# Patient Record
Sex: MALE
[De-identification: names, ages, dates, MRNs, and addresses within clinical notes are randomized per-mention and may not be internally consistent; named-entity substitution may affect disease eponyms.]

---

## 2021-10-04 ENCOUNTER — HOSPITAL ENCOUNTER (EMERGENCY)
Dept: HOSPITAL 97 - ER | Age: 41
Discharge: HOME | End: 2021-10-04
Payer: SELF-PAY

## 2021-10-04 VITALS — DIASTOLIC BLOOD PRESSURE: 98 MMHG | OXYGEN SATURATION: 97 % | SYSTOLIC BLOOD PRESSURE: 148 MMHG

## 2021-10-04 VITALS — TEMPERATURE: 98.4 F

## 2021-10-04 DIAGNOSIS — M62.82: Primary | ICD-10-CM

## 2021-10-04 LAB
ALBUMIN SERPL BCP-MCNC: 3.6 G/DL (ref 3.4–5)
ALP SERPL-CCNC: 29 U/L (ref 45–117)
ALT SERPL W P-5'-P-CCNC: 54 U/L (ref 12–78)
AST SERPL W P-5'-P-CCNC: 35 U/L (ref 15–37)
BUN BLD-MCNC: 11 MG/DL (ref 7–18)
CKMB CREATINE KINASE MB: 7.4 NG/ML (ref 1–3.6)
GLUCOSE SERPLBLD-MCNC: 108 MG/DL (ref 74–106)
HCT VFR BLD CALC: 42.5 % (ref 39.6–49)
INR BLD: 1.06
LIPASE SERPL-CCNC: 211 U/L (ref 73–393)
LYMPHOCYTES # SPEC AUTO: 2.9 K/UL (ref 0.7–4.9)
MAGNESIUM SERPL-MCNC: 2.2 MG/DL (ref 1.8–2.4)
METHAMPHET UR QL SCN: NEGATIVE
NT-PROBNP SERPL-MCNC: 39 PG/ML (ref ?–125)
PMV BLD: 7.1 FL (ref 7.6–11.3)
POTASSIUM SERPL-SCNC: 3.8 MMOL/L (ref 3.5–5.1)
RBC # BLD: 4.86 M/UL (ref 4.33–5.43)
THC SERPL-MCNC: NEGATIVE NG/ML
TROPONIN (EMERG DEPT USE ONLY): < 0.02 NG/ML (ref 0–0.04)

## 2021-10-04 PROCEDURE — 80076 HEPATIC FUNCTION PANEL: CPT

## 2021-10-04 PROCEDURE — 85025 COMPLETE CBC W/AUTO DIFF WBC: CPT

## 2021-10-04 PROCEDURE — 82553 CREATINE MB FRACTION: CPT

## 2021-10-04 PROCEDURE — 96360 HYDRATION IV INFUSION INIT: CPT

## 2021-10-04 PROCEDURE — 80307 DRUG TEST PRSMV CHEM ANLYZR: CPT

## 2021-10-04 PROCEDURE — 71045 X-RAY EXAM CHEST 1 VIEW: CPT

## 2021-10-04 PROCEDURE — 84484 ASSAY OF TROPONIN QUANT: CPT

## 2021-10-04 PROCEDURE — 96361 HYDRATE IV INFUSION ADD-ON: CPT

## 2021-10-04 PROCEDURE — 83690 ASSAY OF LIPASE: CPT

## 2021-10-04 PROCEDURE — 93005 ELECTROCARDIOGRAM TRACING: CPT

## 2021-10-04 PROCEDURE — 83735 ASSAY OF MAGNESIUM: CPT

## 2021-10-04 PROCEDURE — 85610 PROTHROMBIN TIME: CPT

## 2021-10-04 PROCEDURE — 82550 ASSAY OF CK (CPK): CPT

## 2021-10-04 PROCEDURE — 36415 COLL VENOUS BLD VENIPUNCTURE: CPT

## 2021-10-04 PROCEDURE — 80048 BASIC METABOLIC PNL TOTAL CA: CPT

## 2021-10-04 PROCEDURE — 83880 ASSAY OF NATRIURETIC PEPTIDE: CPT

## 2021-10-04 PROCEDURE — 81003 URINALYSIS AUTO W/O SCOPE: CPT

## 2021-10-04 PROCEDURE — 99284 EMERGENCY DEPT VISIT MOD MDM: CPT

## 2021-10-04 NOTE — EDPHYS
Physician Documentation                                                                           

 [facility]                                                                 

Name: Reginaldo Person                                                                             

Age: 41 yrs                                                                                       

Sex: Male                                                                                         

: 1980                                                                                   

MRN: [id number]                                                                                   

Arrival Date: 10/04/2021                                                                          

Time: 20:28                                                                                       

Account#: [id number]                                                                            

Bed 30                                                                                            

Private MD:                                                                                       

ED Physician Cedric Mcmullen                                                                      

HPI:                                                                                              

10/04                                                                                             

21:16 This 41 yrs old  Male presents to ER via Ambulatory with complaints of Edema.   [facility]ar 

21:16 le edema, dark urine. Onset: The symptoms/episode began/occurred 2 day(s) ago. Severity baldemar 

      of symptoms: At their worst the symptoms were mild in the emergency department the          

      symptoms have improved mildly. The patient has not experienced similar symptoms in the      

      past.                                                                                       

                                                                                                  

Historical:                                                                                       

- Allergies:                                                                                      

20:40 No Known Allergies;                                                                     wg  

- Home Meds:                                                                                      

20:40 None [Active];                                                                          wg  

- PMHx:                                                                                           

20:40 None;                                                                                   wg  

                                                                                                  

- Immunization history:: Adult Immunizations up to date.                                          

- Social history:: Smoking status: Patient denies any tobacco usage or history of.                

                                                                                                  

                                                                                                  

ROS:                                                                                              

21:17 Constitutional: Negative for fever, chills, and weight loss, Eyes: Negative for injury, baldemar 

      pain, redness, and discharge, ENT: Negative for injury, pain, and discharge, Neck:          

      Negative for injury, pain, and swelling, Cardiovascular: Negative for chest pain,           

      palpitations, and edema, Respiratory: Negative for shortness of breath, cough,              

      wheezing, and pleuritic chest pain, Abdomen/GI: Negative for abdominal pain, nausea,        

      vomiting, diarrhea, and constipation, Back: Negative for injury and pain, : Negative      

      for injury, bleeding, discharge, and swelling, MS/Extremity: Negative for injury and        

      deformity, Skin: Negative for injury, rash, and discoloration, Neuro: Negative for          

      headache, weakness, numbness, tingling, and seizure, Psych: Negative for depression,        

      anxiety, suicide ideation, homicidal ideation, and hallucinations, Endocrine: Negative      

      for neck swelling, polydipsia, polyuria, polyphagia, and marked weight changes,             

      Hematologic/Lymphatic: Negative for swollen nodes, abnormal bleeding, and unusual           

      bruising.                                                                                   

                                                                                                  

Exam:                                                                                             

21:17 Constitutional:  This is a well developed, well nourished patient who is awake, alert,  baldemar 

      and in no acute distress. Head/Face:  Normocephalic, atraumatic. Eyes:  Pupils equal        

      round and reactive to light, extra-ocular motions intact.  Lids and lashes normal.          

      Conjunctiva and sclera are non-icteric and not injected.  Cornea within normal limits.      

      Periorbital areas with no swelling, redness, or edema. ENT:  Nares patent. No nasal         

      discharge, no septal abnormalities noted.  Tympanic membranes are normal and external       

      auditory canals are clear.  Oropharynx with no redness, swelling, or masses, exudates,      

      or evidence of obstruction, uvula midline.  Mucous membranes moist. Neck:  Trachea          

      midline, no thyromegaly or masses palpated, and no cervical lymphadenopathy.  Supple,       

      full range of motion without nuchal rigidity, or vertebral point tenderness.  No            

      Meningismus. Chest/axilla:  Normal chest wall appearance and motion.  Nontender with no     

      deformity.  No lesions are appreciated. Cardiovascular:  Regular rate and rhythm with a     

      normal S1 and S2.  No gallops, murmurs, or rubs.  Normal PMI, no JVD.  No pulse             

      deficits. Respiratory:  Lungs have equal breath sounds bilaterally, clear to                

      auscultation and percussion.  No rales, rhonchi or wheezes noted.  No increased work of     

      breathing, no retractions or nasal flaring. Abdomen/GI:  Soft, non-tender, with normal      

      bowel sounds.  No distension or tympany.  No guarding or rebound.  No evidence of           

      tenderness throughout. Back:  No spinal tenderness.  No costovertebral tenderness.          

      Full range of motion. Skin:  Warm, dry with normal turgor.  Normal color with no            

      rashes, no lesions, and no evidence of cellulitis. MS/ Extremity:  Pulses equal, no         

      cyanosis.  Neurovascular intact.  Full, normal range of motion. Neuro:  Awake and           

      alert, GCS 15, oriented to person, place, time, and situation.  Cranial nerves II-XII       

      grossly intact.  Motor strength 5/5 in all extremities.  Sensory grossly intact.            

      Cerebellar exam normal.  Normal gait. Psych:  Awake, alert, with orientation to person,     

      place and time.  Behavior, mood, and affect are within normal limits.                       

                                                                                                  

Vital Signs:                                                                                      

20:37  / 70; Pulse 74; Resp 18; Temp 98.4; Pulse Ox 100% on R/A; Weight 74.84 kg;       wg  

      Height 5 ft. 5 in. (165.10 cm); Pain 0/10;                                                  

22:33  / 98 RA Supine (auto/reg); Pulse 73; Resp 18 S; Temp 98.4(O); Pulse Ox 97% on    sj1 

      R/A; Pain 2/10;                                                                             

20:37 Body Mass Index 27.46 (74.84 kg, 165.10 cm)                                             wg  

                                                                                                  

MDM:                                                                                              

20:43 Patient medically screened.                                                             [facility] 

21:18 Data reviewed: vital signs, nurses notes, lab test result(s), EKG, radiologic studies,  baldemar 

      plain films. Data interpreted: Cardiac monitor: rate is 74 beats/min, rhythm is             

      regular, Pulse oximetry: on room air is 100 %. Test interpretation: by ED physician or      

      midlevel provider: ECG, plain radiologic studies. Counseling: I had a detailed              

      discussion with the patient and/or guardian regarding: the historical points, exam          

      findings, and any diagnostic results supporting the discharge/admit diagnosis, lab          

      results, radiology results.                                                                 

                                                                                                  

10/04                                                                                             

20:45 Order name: Basic Metabolic Panel                                                       [facility] 

10/04                                                                                             

20:45 Order name: CBC with Diff                                                               [facility] 

10/04                                                                                             

20:45 Order name: LFT's                                                                       [facility] 

10/04                                                                                             

20:45 Order name: Magnesium; Complete Time: 22:02                                             [facility] 

10/04                                                                                             

20:45 Order name: NT PRO-BNP; Complete Time: 22:02                                            [facility] 

10/04                                                                                             

20:45 Order name: PT-INR; Complete Time: 21:56                                                [facility] 

10/04                                                                                             

20:45 Order name: Troponin (emerg Dept Use Only); Complete Time: 22:02                        [facility] 

10/04                                                                                             

20:45 Order name: Lipase; Complete Time: 22:02                                                [facility] 

10/04                                                                                             

20:45 Order name: CK; Complete Time: 22:02                                                    [facility] 

10/04                                                                                             

20:45 Order name: Ckmb; Complete Time: 22:02                                                  [facility] 

10/04                                                                                             

20:45 Order name: UDS; Complete Time: 21:56                                                   [facility] 

10/04                                                                                             

20:46 Order name: Basic Metabolic Panel; Complete Time: 22:02                                 [facility]

10/04                                                                                             

20:46 Order name: CBC with Automated Diff; Complete Time: 21:56                               [facility]

10/04                                                                                             

20:46 Order name: Liver (Hepatic) Function; Complete Time: 22:02                              [facility]

10/04                                                                                             

20:42 Order name: Urine Dipstick-Ancillary (obtain specimen); Complete Time: 21:27              

10/04                                                                                             

20:45 Order name: XRAY Chest (1 view); Complete Time: 21:56                                   [facility] 

10/04                                                                                             

20:45 Order name: EKG; Complete Time: 20:46                                                   [facility] 

10/04                                                                                             

20:45 Order name: Cardiac monitoring; Complete Time: 20:59                                    [facility] 

10/04                                                                                             

20:45 Order name: EKG - Nurse/Tech; Complete Time: 21:45                                      [facility] 

10/04                                                                                             

20:45 Order name: IV Saline Lock; Complete Time: 20:59                                        [facility] 

10/04                                                                                             

20:45 Order name: Labs collected and sent; Complete Time: 20:59                               [facility] 

10/04                                                                                             

20:45 Order name: O2 Per Protocol; Complete Time: 20:59                                       [facility] 

10/04                                                                                             

20:45 Order name: O2 Sat Monitoring; Complete Time: 20:59                                     [facility] 

10/04                                                                                             

22:29 Order name: Urine Dipstick-Ancillary                                                    [facility]

                                                                                                  

Administered Medications:                                                                         

22:04 Discontinued: NS 0.9% 1000 ml IV at 125 ml/hr continuous                                [facility] 

21:16 Drug: NS 0.9% 500 ml Route: IV; Rate: bolus; Site: left antecubital;                    1 

21:45 Follow up: IV Status: Completed infusion; IV Intake: 1000ml                             [facility] 

21:16 Drug: NS 0.9% 1000 ml Route: IV; Rate: 125 ml/hr; Site: left antecubital;               sj1 

23:29 Follow up: IV Status: Completed infusion; IV Intake: 1000ml                             [facility] 

22:17 Drug: NS 0.9% 500 ml Route: IV; Rate: bolus; Site: left antecubital;                    sj1 

23:29 Follow up: IV Status: Completed infusion; IV Intake: 500ml                              [facility] 

                                                                                                  

                                                                                                  

Disposition Summary:                                                                              

10/04/21 22:03                                                                                    

Discharge Ordered                                                                                 

      Location: Home                                                                          [place] 

      Problem: new                                                                            baldem[facility] 

      Symptoms: have improved                                                                 baldemar 

      Condition: Stable                                                                       baldemar 

      Diagnosis                                                                                   

        - Edema, unspecified                                                                  baldemar 

        - Rhabdomyolysis                                                                      baldemar 

      Followup:                                                                               [facility]dem[facility] 

        - With: Private Physician                                                                  

        - When: 2 - 3 days                                                                         

        - Reason: Recheck today's complaints, Continuance of care, Re-evaluation by your           

      physician                                                                                   

      Followup:                                                                               baldemar 

        - With: GROVER Lamas MD                                                                        

        - When: 2 - 3 days                                                                         

        - Reason: Recheck today's complaints, Re-evaluation by your physician                      

      Discharge Instructions:                                                                     

        - Discharge Summary Sheet                                                             baldemar 

        - Dehydration, Adult                                                                  baldemar 

        - Edema                                                                               baldemar 

        - Edema, Easy-to-Read                                                                 baldemar 

        - Dehydration, Adult, Easy-to-Read                                                    baldemar 

        - Rhabdomyolysis                                                                      baldemar 

        - Peripheral Edema                                                                    baldemar 

      Forms:                                                                                      

        - Medication Reconciliation Form                                                      baldemar 

        - Thank You Letter                                                                    baldemar 

        - Antibiotic Education                                                                baldemar 

        - Prescription Opioid Use                                                             baldemar 

Signatures:                                                                                       

Dispatcher [employer]                           Cedric Higginbotham MD MD cha Gamba, Liam, RN wg Johnson, Sade, RN                       RN   sj1                                                  

                                                                                                  

**************************************************************************************************

## 2021-10-04 NOTE — ER
Nurse's Notes                                                                                     

 [facility]                                                                 

Name: Reginaldo Person                                                                             

Age: 41 yrs                                                                                       

Sex: Male                                                                                         

: 1980                                                                                   

MRN: [id number]                                                                                   

Arrival Date: 10/04/2021                                                                          

Time: 20:28                                                                                       

Account#: [id number]                                                                            

Bed 30                                                                                            

Private MD:                                                                                       

Diagnosis: Edema, unspecified;Rhabdomyolysis                                                      

                                                                                                  

Presentation:                                                                                     

10/04                                                                                             

20:37 Chief complaint: Patient states: Pt states he has noticed his urine has been            wg  

      dark/concentrated and has had some edema in his ankles/feet. Pt states he takes a lot       

      of supplements for body building and has been drinking and retaining a lot of water. Pt     

      denies SOB, CP, Abd pain or dizziness. Coronavirus screen: Vaccine status: Patient          

      reports receiving the 2nd dose of the covid vaccine. Date 2021 At this time,     

      the client does not indicate any symptoms associated with coronavirus-19. Ebola Screen:     

      Patient negative for fever greater than or equal to 101.5 degrees Fahrenheit, and           

      additional compatible Ebola Virus Disease symptoms Patient denies exposure to               

      infectious person. Patient denies travel to an Ebola-affected area in the 21 days           

      before illness onset. No symptoms or risks identified at this time. Initial Sepsis          

      Screen: Does the patient meet any 2 criteria? No. Patient's initial sepsis screen is        

      negative. Does the patient have a suspected source of infection? No. Patient's initial      

      sepsis screen is negative. Risk Assessment: Do you want to hurt yourself or someone         

      else? Patient reports no desire to harm self or others. Onset of symptoms was 2021.                                                                                    

20:37 Method Of Arrival: Ambulatory                                                             

20:37 Acuity: [facility] 3                                                                           wg  

                                                                                                  

Triage Assessment:                                                                                

20:40 General: Appears in no apparent distress. well developed, Behavior is calm,             wg  

      cooperative, appropriate for age. Pain: Denies pain.                                        

                                                                                                  

Historical:                                                                                       

- Allergies:                                                                                      

20:40 No Known Allergies;                                                                     wg  

- Home Meds:                                                                                      

20:40 None [Active];                                                                          wg  

- PMHx:                                                                                           

20:40 None;                                                                                   wg  

                                                                                                  

- Immunization history:: Adult Immunizations up to date.                                          

- Social history:: Smoking status: Patient denies any tobacco usage or history of.                

                                                                                                  

                                                                                                  

Screenin:54 Abuse screen: Denies threats or abuse. Denies injuries from another. Nutritional        sj1 

      screening: No deficits noted. Tuberculosis screening: No symptoms or risk factors           

      identified. Fall Risk None identified.                                                      

                                                                                                  

Assessment:                                                                                       

20:54 General: Appears in no apparent distress. Behavior is calm, cooperative, appropriate    sj1 

      for age. Pain: Complains of pain in bilat upper and lower ext. Neuro: No deficits           

      noted. Cardiovascular: No deficits noted. Cardiovascular: bilat edema in lower ext .        

      Respiratory: No deficits noted. GI: No deficits noted. : No deficits noted. EENT: No      

      deficits noted. Derm: No deficits noted. Musculoskeletal: No deficits noted.                

23:04 Reassessment: Patient appears in no apparent distress at this time. No changes from     sj1 

      previously documented assessment. Patient and/or family updated on plan of care and         

      expected duration. Pain level reassessed.                                                   

                                                                                                  

Vital Signs:                                                                                      

20:37  / 70; Pulse 74; Resp 18; Temp 98.4; Pulse Ox 100% on R/A; Weight 74.84 kg;       wg  

      Height 5 ft. 5 in. (165.10 cm); Pain 0/10;                                                  

22:33  / 98 RA Supine (auto/reg); Pulse 73; Resp 18 S; Temp 98.4(O); Pulse Ox 97% on    sj1 

      R/A; Pain 2/10;                                                                             

20:37 Body Mass Index 27.46 (74.84 kg, 165.10 cm)                                               

                                                                                                  

ED Course:                                                                                        

20:28 Patient arrived in ED.                                                                  wm  

20:40 Triage completed.                                                                       wg  

20:41 Arm band placed on left wrist.                                                          wg  

20:43 Cedric Mcmullen MD is Attending Physician.                                             baldemar 

20:54 Patient has correct armband on for positive identification. Bed in low position. Call   sj1 

      light in reach. Side rails up X 1.                                                          

20:54 No provider procedures requiring assistance completed. Inserted saline lock: 18 gauge   sj1 

      in right antecubital area, using aseptic technique. Blood collected.                        

20:58 Basic Metabolic Panel Sent.                                                             sj1 

20:58 CBC with Diff Sent.                                                                     sj1 

20:58 LFT's Sent.                                                                             sj1 

21:26 XRAY Chest (1 view) In Process Unspecified.                                             EDMS

22:02 GROVER Lamas MD is Referral Physician.                                                      baldemar 

23:06 Charmaine Herrera, RN is Primary Nurse.                                                     sj1 

23:29 IV discontinued, intact, bleeding controlled, No redness/swelling at site.              sj1 

                                                                                                  

Administered Medications:                                                                         

22:04 Discontinued: NS 0.9% 1000 ml IV at 125 ml/hr continuous                                baldemar 

21:16 Drug: NS 0.9% 500 ml Route: IV; Rate: bolus; Site: left antecubital;                    sj1 

21:45 Follow up: IV Status: Completed infusion; IV Intake: 1000ml                             sj1 

21:16 Drug: NS 0.9% 1000 ml Route: IV; Rate: 125 ml/hr; Site: left antecubital;               sj1 

23:29 Follow up: IV Status: Completed infusion; IV Intake: 1000ml                             sj1 

22:17 Drug: NS 0.9% 500 ml Route: IV; Rate: bolus; Site: left antecubital;                    sj1 

23:29 Follow up: IV Status: Completed infusion; IV Intake: 500ml                              sj1 

                                                                                                  

                                                                                                  

Intake:                                                                                           

21:45 IV: 1000ml; Total: 1000ml.                                                              sj1 

23:29 IV: 500ml; Total: 1500ml.                                                               sj1 

23:29 IV: 1000ml; Total: 2500ml.                                                              sj1 

                                                                                                  

Outcome:                                                                                          

22:03 Discharge ordered by MD.                                                                baldemar 

23:05 Discharged to home ambulatory.                                                          sj1 

23:05 Discharged to home ambulatory.                                                              

23:05 Condition: stable                                                                           

23:05 Discharge instructions given to patient, Instructed on discharge instructions, follow       

      up and referral plans. Demonstrated understanding of instructions, follow-up care.          

23:30 Patient left the ED.                                                                    sj1 

                                                                                                  

Signatures:                                                                                       

Dispatcher [employer]                           EDCedric Shoemaker MD MD cha Marsh, Wendy wm Gamba, Liam, RN wg Johnson, Sade, RN                       RN   sj1                                                  

                                                                                                  

**************************************************************************************************

## 2021-10-04 NOTE — RAD REPORT
EXAM DESCRIPTION:  RAD - Chest Single View - 10/4/2021 9:27 pm

 

CLINICAL HISTORY:  COUGH

 

COMPARISON:  No comparisons

 

FINDINGS:  Lines: None.

Lungs: No evidence of edema or pneumonia.

Pleural: No significant pleural effusions or pneumothorax.

Cardiac: The heart size is within normal limits.

Bones: No acute fractures.

Other:

 

IMPRESSION:  No acute cardiopulmonary disease.

## 2021-10-05 NOTE — EKG
Test Date:    2021-10-04               Test Time:    21:43:31

Technician:                                          

                                                     

MEASUREMENT RESULTS:                                       

Intervals:                                           

Rate:         64                                     

LA:           150                                    

QRSD:         96                                     

QT:           386                                    

QTc:          398                                    

Axis:                                                

P:            65                                     

LA:           150                                    

QRS:          81                                     

T:            38                                     

                                                     

INTERPRETIVE STATEMENTS:                                       

                                                     

Normal sinus rhythm

Cannot rule out Anterior infarct, age undetermined

Abnormal ECG

No previous ECG available for comparison



Electronically Signed On 10-05-21 18:05:48 CDT by Mike Rosenberg